# Patient Record
Sex: FEMALE | NOT HISPANIC OR LATINO | URBAN - METROPOLITAN AREA
[De-identification: names, ages, dates, MRNs, and addresses within clinical notes are randomized per-mention and may not be internally consistent; named-entity substitution may affect disease eponyms.]

---

## 2021-02-19 ENCOUNTER — OFFICE VISIT (OUTPATIENT)
Dept: URBAN - METROPOLITAN AREA CLINIC 68 | Facility: CLINIC | Age: 66
End: 2021-02-19

## 2021-03-16 ENCOUNTER — TELEPHONE ENCOUNTER (OUTPATIENT)
Dept: URBAN - METROPOLITAN AREA CLINIC 68 | Facility: CLINIC | Age: 66
End: 2021-03-16

## 2021-03-16 ENCOUNTER — OFFICE VISIT (OUTPATIENT)
Dept: URBAN - METROPOLITAN AREA SURGERY CENTER 12 | Facility: SURGERY CENTER | Age: 66
End: 2021-03-16

## 2021-05-21 ENCOUNTER — OFFICE VISIT (OUTPATIENT)
Dept: URBAN - METROPOLITAN AREA CLINIC 68 | Facility: CLINIC | Age: 66
End: 2021-05-21

## 2021-05-21 LAB
C DIFFICILE TOXINS A+B, EIA: NEGATIVE
C DIFFICILE TOXINS A+B, EIA: NEGATIVE
CALPROTECTIN, FECAL: (no result)
CALPROTECTIN, FECAL: (no result)
CAMPYLOBACTER CULTURE: (no result)
CAMPYLOBACTER CULTURE: (no result)
E COLI SHIGA TOXIN EIA: NEGATIVE
E COLI SHIGA TOXIN EIA: NEGATIVE
Lab: (no result)
Lab: (no result)
OVA + PARASITE EXAM: (no result)
OVA + PARASITE EXAM: (no result)
SALMONELLA/SHIGELLA SCREEN: (no result)
SALMONELLA/SHIGELLA SCREEN: (no result)

## 2021-05-27 ENCOUNTER — TELEPHONE ENCOUNTER (OUTPATIENT)
Dept: URBAN - METROPOLITAN AREA CLINIC 68 | Facility: CLINIC | Age: 66
End: 2021-05-27

## 2021-05-27 ENCOUNTER — LAB OUTSIDE AN ENCOUNTER (OUTPATIENT)
Dept: URBAN - METROPOLITAN AREA CLINIC 68 | Facility: CLINIC | Age: 66
End: 2021-05-27

## 2021-05-27 LAB
CLOSTRIDIUM DIFFICILE TOXIN A+B, EIA: (no result)
CLOSTRIDIUM DIFFICILE TOXIN A+B, EIA: (no result)

## 2021-06-07 ENCOUNTER — OFFICE VISIT (OUTPATIENT)
Dept: URBAN - METROPOLITAN AREA CLINIC 68 | Facility: CLINIC | Age: 66
End: 2021-06-07

## 2021-06-14 ENCOUNTER — TELEPHONE ENCOUNTER (OUTPATIENT)
Dept: URBAN - METROPOLITAN AREA CLINIC 68 | Facility: CLINIC | Age: 66
End: 2021-06-14

## 2021-06-18 ENCOUNTER — OFFICE VISIT (OUTPATIENT)
Dept: URBAN - METROPOLITAN AREA CLINIC 68 | Facility: CLINIC | Age: 66
End: 2021-06-18

## 2021-06-24 ENCOUNTER — OFFICE VISIT (OUTPATIENT)
Dept: URBAN - METROPOLITAN AREA CLINIC 68 | Facility: CLINIC | Age: 66
End: 2021-06-24

## 2022-06-04 ENCOUNTER — TELEPHONE ENCOUNTER (OUTPATIENT)
Dept: URBAN - METROPOLITAN AREA CLINIC 68 | Facility: CLINIC | Age: 67
End: 2022-06-04

## 2022-06-04 RX ORDER — POLYETHYLENE GLYOCOL 3350, SODIUM CHLORIDE, SODIUM BICARBONATE AND POTASSIUM CHLORIDE 420; 11.2; 5.72; 1.48 G/4L; G/4L; G/4L; G/4L
POWDER, FOR SOLUTION NASOGASTRIC; ORAL AS DIRECTED
Qty: 1 | Refills: 0 | OUTPATIENT
Start: 2016-01-25 | End: 2019-02-01

## 2022-06-04 RX ORDER — SODIUM SULFATE, MAGNESIUM SULFATE, AND POTASSIUM CHLORIDE 17.75; 2.7; 2.25 G/1; G/1; G/1
TABLET ORAL ONCE
Qty: 1 | Refills: 0 | OUTPATIENT
Start: 2021-02-19 | End: 2021-02-20

## 2022-06-04 RX ORDER — SODIUM SULFATE, POTASSIUM SULFATE, MAGNESIUM SULFATE 17.5; 3.13; 1.6 G/ML; G/ML; G/ML
SOLUTION, CONCENTRATE ORAL AS DIRECTED
Qty: 1 | Refills: 0 | OUTPATIENT
Start: 2019-02-08 | End: 2019-02-09

## 2022-06-05 ENCOUNTER — TELEPHONE ENCOUNTER (OUTPATIENT)
Dept: URBAN - METROPOLITAN AREA CLINIC 68 | Facility: CLINIC | Age: 67
End: 2022-06-05

## 2022-06-05 RX ORDER — METFORMIN HYDROCHLORIDE 500 MG/1
METFORMIN HCL( 500MG ORAL 1 DAILY ) ACTIVE -HX ENTRY TABLET, COATED ORAL DAILY
Status: ACTIVE | COMMUNITY
Start: 2021-06-18

## 2022-06-05 RX ORDER — LORATADINE 10 MG/1
LORATADINE( 10MG ORAL  DAILY ) ACTIVE -HX ENTRY TABLET ORAL DAILY
Status: ACTIVE | COMMUNITY
Start: 2021-06-18

## 2022-06-25 ENCOUNTER — TELEPHONE ENCOUNTER (OUTPATIENT)
Age: 67
End: 2022-06-25

## 2022-06-25 RX ORDER — ASPIRIN 81 MG/1
LOW-DOSE ASPIRIN( 81MG ORAL 1 DAILY ) INACTIVE -HX ENTRY TABLET, COATED ORAL DAILY
OUTPATIENT
Start: 2021-02-19

## 2022-06-25 RX ORDER — SODIUM SULFATE, MAGNESIUM SULFATE, AND POTASSIUM CHLORIDE 17.75; 2.7; 2.25 G/1; G/1; G/1
TABLET ORAL ONCE
Qty: 1 | Refills: 0 | OUTPATIENT
Start: 2021-02-19 | End: 2021-02-20

## 2022-06-25 RX ORDER — SODIUM SULFATE, POTASSIUM SULFATE, MAGNESIUM SULFATE 17.5; 3.13; 1.6 G/ML; G/ML; G/ML
SOLUTION, CONCENTRATE ORAL AS DIRECTED
Qty: 1 | Refills: 0 | OUTPATIENT
Start: 2019-02-08 | End: 2019-02-09

## 2022-06-26 ENCOUNTER — TELEPHONE ENCOUNTER (OUTPATIENT)
Age: 67
End: 2022-06-26

## 2022-06-26 RX ORDER — METFORMIN HCL 500 MG/1
METFORMIN HCL( 500MG ORAL 1 DAILY ) ACTIVE -HX ENTRY TABLET ORAL DAILY
Status: ACTIVE | COMMUNITY
Start: 2021-06-18

## 2022-06-26 RX ORDER — ELECTROLYTES/DEXTROSE
VITAMIN D3( 1000UNIT ORAL 2 DAILY ) ACTIVE -HX ENTRY SOLUTION, ORAL ORAL DAILY
Status: ACTIVE | COMMUNITY
Start: 2021-06-18

## 2022-06-26 RX ORDER — DEXTROSE 4 G
LORATADINE( 10MG ORAL  DAILY ) ACTIVE -HX ENTRY TABLET,CHEWABLE ORAL DAILY
Status: ACTIVE | COMMUNITY
Start: 2021-06-18

## 2024-01-18 ENCOUNTER — TELEPHONE ENCOUNTER (OUTPATIENT)
Dept: URBAN - METROPOLITAN AREA CLINIC 68 | Facility: CLINIC | Age: 69
End: 2024-01-18

## 2024-01-22 ENCOUNTER — OFFICE VISIT (OUTPATIENT)
Dept: URBAN - METROPOLITAN AREA CLINIC 68 | Facility: CLINIC | Age: 69
End: 2024-01-22
Payer: MEDICARE

## 2024-01-22 ENCOUNTER — LAB OUTSIDE AN ENCOUNTER (OUTPATIENT)
Dept: URBAN - METROPOLITAN AREA CLINIC 68 | Facility: CLINIC | Age: 69
End: 2024-01-22

## 2024-01-22 VITALS
SYSTOLIC BLOOD PRESSURE: 124 MMHG | DIASTOLIC BLOOD PRESSURE: 76 MMHG | HEART RATE: 74 BPM | OXYGEN SATURATION: 99 % | BODY MASS INDEX: 25.3 KG/M2 | HEIGHT: 61 IN | WEIGHT: 134 LBS

## 2024-01-22 DIAGNOSIS — K57.30 DIVERTICULOSIS OF LARGE INTESTINE WITHOUT HEMORRHAGE: ICD-10-CM

## 2024-01-22 DIAGNOSIS — K62.1 RECTAL POLYP: ICD-10-CM

## 2024-01-22 DIAGNOSIS — R10.13 DYSPEPSIA: ICD-10-CM

## 2024-01-22 PROBLEM — 162031009: Status: ACTIVE | Noted: 2024-01-22

## 2024-01-22 PROCEDURE — 99214 OFFICE O/P EST MOD 30 MIN: CPT

## 2024-01-22 RX ORDER — ELECTROLYTES/DEXTROSE
VITAMIN D3( 1000UNIT ORAL 2 DAILY ) ACTIVE -HX ENTRY SOLUTION, ORAL ORAL DAILY
Status: ACTIVE | COMMUNITY
Start: 2021-06-18

## 2024-01-22 RX ORDER — DEXTROSE 4 G
LORATADINE( 10MG ORAL  DAILY ) ACTIVE -HX ENTRY TABLET,CHEWABLE ORAL DAILY
Status: ACTIVE | COMMUNITY
Start: 2021-06-18

## 2024-01-22 RX ORDER — SOD SULF/POT CHLORIDE/MAG SULF 1.479 G
12 TABLETS THE FIRST DOSE THE EVENING BEFORE AND SECOND DOSE THE MORNING OF COLONOSCOPY TABLET ORAL TWICE A DAY
Qty: 24 | Refills: 0 | OUTPATIENT
Start: 2024-01-22 | End: 2024-01-23

## 2024-01-22 RX ORDER — METFORMIN HCL 500 MG/1
METFORMIN HCL( 500MG ORAL 1 DAILY ) ACTIVE -HX ENTRY TABLET ORAL DAILY
Status: ACTIVE | COMMUNITY
Start: 2021-06-18

## 2024-01-22 NOTE — HPI-TODAY'S VISIT:
Patient evaluated due to personal history of colon polyps. Has upcoming [facility] colonoscopy scheduled. Refers intermittent feelings of food getting stuck in chest. Refers rare episodes of heartburn approximately once every 2 months.  Refers Famotidine prn with significant relief. She denies nausea/vomiting, abdominal pain, melena, diarrhea, constipation, rectal bleeding, weight loss, fever.

## 2024-02-12 ENCOUNTER — DAC (OUTPATIENT)
Dept: URBAN - METROPOLITAN AREA SURGERY CENTER 12 | Facility: SURGERY CENTER | Age: 69
End: 2024-02-12

## 2024-02-19 ENCOUNTER — LAB (OUTPATIENT)
Dept: URBAN - METROPOLITAN AREA CLINIC 4 | Facility: CLINIC | Age: 69
End: 2024-02-19
Payer: MEDICARE

## 2024-02-19 ENCOUNTER — COL/EGD (OUTPATIENT)
Dept: URBAN - METROPOLITAN AREA SURGERY CENTER 12 | Facility: SURGERY CENTER | Age: 69
End: 2024-02-19
Payer: MEDICARE

## 2024-02-19 DIAGNOSIS — K29.70 GASTRITIS, UNSPECIFIED, WITHOUT BLEEDING: ICD-10-CM

## 2024-02-19 DIAGNOSIS — K64.8 OTHER HEMORRHOIDS: ICD-10-CM

## 2024-02-19 DIAGNOSIS — K29.70 GASTRITIS WITHOUT BLEEDING, UNSPECIFIED CHRONICITY, UNSPECIFIED GASTRITIS TYPE: ICD-10-CM

## 2024-02-19 DIAGNOSIS — K31.7 POLYP OF STOMACH AND DUODENUM: ICD-10-CM

## 2024-02-19 DIAGNOSIS — K31.89 OTHER DISEASES OF STOMACH AND DUODENUM: ICD-10-CM

## 2024-02-19 DIAGNOSIS — K57.30 DIVERTICULOSIS OF LARGE INTESTINE WITHOUT PERFORATION OR ABSCESS WITHOUT BLEEDING: ICD-10-CM

## 2024-02-19 DIAGNOSIS — Z86.010 PERSONAL HISTORY OF COLONIC POLYPS: ICD-10-CM

## 2024-02-19 PROCEDURE — 43239 EGD BIOPSY SINGLE/MULTIPLE: CPT | Performed by: INTERNAL MEDICINE

## 2024-02-19 PROCEDURE — G0105 COLORECTAL SCRN; HI RISK IND: HCPCS | Performed by: INTERNAL MEDICINE

## 2024-02-19 PROCEDURE — 88305 TISSUE EXAM BY PATHOLOGIST: CPT | Performed by: PATHOLOGY

## 2024-02-19 PROCEDURE — 88312 SPECIAL STAINS GROUP 1: CPT | Performed by: PATHOLOGY

## 2024-02-19 RX ORDER — METFORMIN HCL 500 MG/1
METFORMIN HCL( 500MG ORAL 1 DAILY ) ACTIVE -HX ENTRY TABLET ORAL DAILY
Status: ACTIVE | COMMUNITY
Start: 2021-06-18

## 2024-02-19 RX ORDER — ASCORBIC ACID 1000 MG
VITAMIN D3( 1000UNIT ORAL 2 DAILY ) ACTIVE -HX ENTRY TABLET ORAL DAILY
Status: ACTIVE | COMMUNITY
Start: 2021-06-18

## 2024-02-19 RX ORDER — DEXTROSE 4 G
LORATADINE( 10MG ORAL  DAILY ) ACTIVE -HX ENTRY TABLET,CHEWABLE ORAL DAILY
Status: ACTIVE | COMMUNITY
Start: 2021-06-18

## 2024-03-04 ENCOUNTER — OV EP (OUTPATIENT)
Dept: URBAN - METROPOLITAN AREA CLINIC 68 | Facility: CLINIC | Age: 69
End: 2024-03-04
Payer: MEDICARE

## 2024-03-04 VITALS
OXYGEN SATURATION: 97 % | HEIGHT: 61 IN | SYSTOLIC BLOOD PRESSURE: 138 MMHG | WEIGHT: 134 LBS | BODY MASS INDEX: 25.3 KG/M2 | DIASTOLIC BLOOD PRESSURE: 74 MMHG | HEART RATE: 76 BPM

## 2024-03-04 DIAGNOSIS — Z86.010 PERSONAL HISTORY OF COLONIC POLYPS: ICD-10-CM

## 2024-03-04 DIAGNOSIS — K57.30 DIVERTICULOSIS OF LARGE INTESTINE WITHOUT HEMORRHAGE: ICD-10-CM

## 2024-03-04 DIAGNOSIS — R10.13 DYSPEPSIA: ICD-10-CM

## 2024-03-04 DIAGNOSIS — K29.30 CHRONIC SUPERFICIAL GASTRITIS WITHOUT BLEEDING: ICD-10-CM

## 2024-03-04 PROCEDURE — 99213 OFFICE O/P EST LOW 20 MIN: CPT

## 2024-03-04 RX ORDER — DEXTROSE 4 G
LORATADINE( 10MG ORAL  DAILY ) ACTIVE -HX ENTRY TABLET,CHEWABLE ORAL DAILY
Status: ACTIVE | COMMUNITY
Start: 2021-06-18

## 2024-03-04 RX ORDER — METFORMIN HCL 500 MG/1
METFORMIN HCL( 500MG ORAL 1 DAILY ) ACTIVE -HX ENTRY TABLET ORAL DAILY
Status: ACTIVE | COMMUNITY
Start: 2021-06-18

## 2024-03-04 RX ORDER — ASCORBIC ACID 1000 MG
VITAMIN D3( 1000UNIT ORAL 2 DAILY ) ACTIVE -HX ENTRY TABLET ORAL DAILY
Status: ACTIVE | COMMUNITY
Start: 2021-06-18

## 2024-03-04 NOTE — HPI-TODAY'S VISIT:
Patient presents for follow-up s/p EGD and colonoscopy. Refers globus sensation has resolved. Refers Famotidine prn with significant relief of heartburn. She denies nausea/vomiting, dysphagia, abdominal pain, melena, diarrhea, constipation, rectal bleeding, weight loss, fever.

## 2024-08-13 ENCOUNTER — NEW PATIENT (OUTPATIENT)
Dept: URBAN - METROPOLITAN AREA CLINIC 32 | Facility: CLINIC | Age: 69
End: 2024-08-13

## 2024-08-13 DIAGNOSIS — H57.813: ICD-10-CM

## 2024-08-13 DIAGNOSIS — H02.836: ICD-10-CM

## 2024-08-13 DIAGNOSIS — H02.833: ICD-10-CM

## 2024-08-13 PROCEDURE — 99204 OFFICE O/P NEW MOD 45 MIN: CPT

## 2024-08-13 PROCEDURE — 92285 EXTERNAL OCULAR PHOTOGRAPHY: CPT

## 2024-08-13 PROCEDURE — 92081 LIMITED VISUAL FIELD XM: CPT

## 2024-08-13 ASSESSMENT — VISUAL ACUITY
OS_CC: 20/100+2
OD_SC: 20/30

## 2024-09-18 ENCOUNTER — SURGERY/PROCEDURE (OUTPATIENT)
Facility: LOCATION | Age: 69
End: 2024-09-18

## 2024-09-18 DIAGNOSIS — H02.834: ICD-10-CM

## 2024-09-18 DIAGNOSIS — H02.832: ICD-10-CM

## 2024-09-18 DIAGNOSIS — H02.831: ICD-10-CM

## 2024-09-18 DIAGNOSIS — H02.835: ICD-10-CM

## 2024-09-18 PROCEDURE — 17999LSR LASER SKIN RESURFACING 1 RG

## 2024-09-18 PROCEDURE — 1582350 UPPER BLEPH PER EYE FUNCTIONAL-BILATERAL: Mod: 50

## 2024-09-18 PROCEDURE — 15821B BLEPH W/HERN FAT PAD LOWER LID- BERLIE: Mod: 50

## 2024-09-19 ENCOUNTER — POST-OP (OUTPATIENT)
Dept: URBAN - METROPOLITAN AREA CLINIC 32 | Facility: CLINIC | Age: 69
End: 2024-09-19

## 2024-09-19 DIAGNOSIS — Z98.890: ICD-10-CM

## 2024-09-19 PROCEDURE — 99024 POSTOP FOLLOW-UP VISIT: CPT

## 2024-09-23 ENCOUNTER — POST-OP (OUTPATIENT)
Dept: URBAN - METROPOLITAN AREA CLINIC 32 | Facility: CLINIC | Age: 69
End: 2024-09-23

## 2024-09-23 DIAGNOSIS — Z98.890: ICD-10-CM

## 2024-09-23 PROCEDURE — 99024 POSTOP FOLLOW-UP VISIT: CPT

## 2024-09-27 ENCOUNTER — POST-OP (OUTPATIENT)
Dept: URBAN - METROPOLITAN AREA CLINIC 32 | Facility: CLINIC | Age: 69
End: 2024-09-27

## 2024-09-27 DIAGNOSIS — Z98.890: ICD-10-CM

## 2024-09-27 PROCEDURE — 99024 POSTOP FOLLOW-UP VISIT: CPT

## 2024-10-03 ENCOUNTER — EMERGENCY VISIT (OUTPATIENT)
Dept: URBAN - METROPOLITAN AREA CLINIC 32 | Facility: CLINIC | Age: 69
End: 2024-10-03

## 2024-10-03 DIAGNOSIS — Z98.890: ICD-10-CM

## 2024-10-03 PROCEDURE — 99024 POSTOP FOLLOW-UP VISIT: CPT

## 2024-10-14 ENCOUNTER — EMERGENCY VISIT (OUTPATIENT)
Dept: URBAN - METROPOLITAN AREA CLINIC 32 | Facility: CLINIC | Age: 69
End: 2024-10-14

## 2024-10-14 DIAGNOSIS — H02.843: ICD-10-CM

## 2024-10-14 PROCEDURE — 99213 OFFICE O/P EST LOW 20 MIN: CPT

## 2024-10-14 RX ORDER — OLOPATADINE HYDROCHLORIDE 2 MG/ML: 1 SOLUTION OPHTHALMIC TWICE A DAY

## 2024-10-24 ENCOUNTER — POST-OP (OUTPATIENT)
Dept: URBAN - METROPOLITAN AREA CLINIC 32 | Facility: CLINIC | Age: 69
End: 2024-10-24

## 2024-10-24 DIAGNOSIS — H02.846: ICD-10-CM

## 2024-10-24 DIAGNOSIS — H02.843: ICD-10-CM

## 2024-10-24 PROCEDURE — 99024 POSTOP FOLLOW-UP VISIT: CPT

## 2025-05-29 ENCOUNTER — COMPREHENSIVE EXAM (OUTPATIENT)
Age: 70
End: 2025-05-29

## 2025-05-29 DIAGNOSIS — H04.123: ICD-10-CM

## 2025-05-29 DIAGNOSIS — H25.13: ICD-10-CM

## 2025-05-29 DIAGNOSIS — H35.363: ICD-10-CM

## 2025-05-29 PROCEDURE — 99214 OFFICE O/P EST MOD 30 MIN: CPT

## 2025-05-29 PROCEDURE — 92134 CPTRZ OPH DX IMG PST SGM RTA: CPT

## 2025-05-29 PROCEDURE — 92015 DETERMINE REFRACTIVE STATE: CPT

## 2025-06-05 ENCOUNTER — ADDENDUM (OUTPATIENT)
Age: 70
End: 2025-06-05